# Patient Record
Sex: MALE | Race: BLACK OR AFRICAN AMERICAN | NOT HISPANIC OR LATINO | URBAN - METROPOLITAN AREA
[De-identification: names, ages, dates, MRNs, and addresses within clinical notes are randomized per-mention and may not be internally consistent; named-entity substitution may affect disease eponyms.]

---

## 2020-01-22 ENCOUNTER — EMERGENCY (EMERGENCY)
Facility: HOSPITAL | Age: 20
LOS: 0 days | Discharge: HOME | End: 2020-01-22
Attending: EMERGENCY MEDICINE
Payer: MEDICAID

## 2020-01-22 VITALS — WEIGHT: 164.91 LBS | HEIGHT: 71 IN

## 2020-01-22 VITALS
OXYGEN SATURATION: 97 % | SYSTOLIC BLOOD PRESSURE: 129 MMHG | TEMPERATURE: 99 F | DIASTOLIC BLOOD PRESSURE: 72 MMHG | HEART RATE: 69 BPM | RESPIRATION RATE: 18 BRPM

## 2020-01-22 DIAGNOSIS — R06.02 SHORTNESS OF BREATH: ICD-10-CM

## 2020-01-22 DIAGNOSIS — R07.9 CHEST PAIN, UNSPECIFIED: ICD-10-CM

## 2020-01-22 DIAGNOSIS — F12.10 CANNABIS ABUSE, UNCOMPLICATED: ICD-10-CM

## 2020-01-22 PROCEDURE — 71046 X-RAY EXAM CHEST 2 VIEWS: CPT

## 2020-01-22 PROCEDURE — 99283 EMERGENCY DEPT VISIT LOW MDM: CPT

## 2020-01-22 PROCEDURE — 99283 EMERGENCY DEPT VISIT LOW MDM: CPT | Mod: 25

## 2020-01-22 PROCEDURE — 94640 AIRWAY INHALATION TREATMENT: CPT

## 2020-01-22 PROCEDURE — 71046 X-RAY EXAM CHEST 2 VIEWS: CPT | Mod: 26

## 2020-01-22 PROCEDURE — 93005 ELECTROCARDIOGRAM TRACING: CPT

## 2020-01-22 PROCEDURE — 93010 ELECTROCARDIOGRAM REPORT: CPT

## 2020-01-22 RX ORDER — IBUPROFEN 200 MG
600 TABLET ORAL ONCE
Refills: 0 | Status: COMPLETED | OUTPATIENT
Start: 2020-01-22 | End: 2020-01-22

## 2020-01-22 RX ORDER — ALBUTEROL 90 UG/1
1 AEROSOL, METERED ORAL ONCE
Refills: 0 | Status: COMPLETED | OUTPATIENT
Start: 2020-01-22 | End: 2020-01-22

## 2020-01-22 RX ADMIN — ALBUTEROL 1 PUFF(S): 90 AEROSOL, METERED ORAL at 22:43

## 2020-01-22 NOTE — ED STATDOCS - OBJECTIVE STATEMENT
18 y/o male with no pertinent PMHx presents to the ED c/o chest pain and SOB x1 week. States "it feels like someone is putting a boulder on my chest." Chest pain and SOB are constant, even when at rest. Denies fever, nausea, vomiting, abdominal pain. No increase in physical activity. No recent illness. No recent travel. +smokes marijuana daily.

## 2020-01-22 NOTE — ED STATDOCS - PATIENT PORTAL LINK FT
You can access the FollowMyHealth Patient Portal offered by [facility] by registering at the following website: [email or web address]. By joining [employer]’s FollowMyHealth portal, you will also be able to view your health information using other applications (apps) compatible with our system.

## 2020-05-31 ENCOUNTER — EMERGENCY (EMERGENCY)
Facility: HOSPITAL | Age: 20
LOS: 0 days | Discharge: HOME | End: 2020-05-31
Attending: EMERGENCY MEDICINE
Payer: MEDICAID

## 2020-05-31 VITALS
RESPIRATION RATE: 18 BRPM | HEART RATE: 79 BPM | DIASTOLIC BLOOD PRESSURE: 76 MMHG | TEMPERATURE: 99 F | OXYGEN SATURATION: 100 % | SYSTOLIC BLOOD PRESSURE: 128 MMHG

## 2020-05-31 VITALS — WEIGHT: 164.91 LBS | HEIGHT: 71 IN

## 2020-05-31 DIAGNOSIS — M26.30 UNSPECIFIED ANOMALY OF TOOTH POSITION OF FULLY ERUPTED TOOTH OR TEETH: ICD-10-CM

## 2020-05-31 DIAGNOSIS — K13.79 OTHER LESIONS OF ORAL MUCOSA: ICD-10-CM
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
## 2020-05-31 PROCEDURE — 99282 EMERGENCY DEPT VISIT SF MDM: CPT

## 2020-05-31 PROCEDURE — 99283 EMERGENCY DEPT VISIT LOW MDM: CPT

## 2020-05-31 NOTE — ED ADULT TRIAGE NOTE - CHIEF COMPLAINT QUOTE
c/o front tooth displaced while playing basketball, other players elbow hit him on mouth, no bleeding noted, c/o headache

## 2020-05-31 NOTE — ED STATDOCS - PATIENT PORTAL LINK FT
You can access the FollowMyHealth Patient Portal offered by [facility] by registering at the following website: [email or web address]. By joining [employer]’s FollowMyHealth portal, you will also be able to view your health information using other applications (apps) compatible with our system.

## 2020-05-31 NOTE — ED STATDOCS - OBJECTIVE STATEMENT
20 yo M with no PMHx presents after he was elbowed in the mouth while playing basketball, he states one of his front teeth was pushed back and is now out of place but not wiggley, pt has no other acute complaints at this time.

## 2020-05-31 NOTE — ED STATDOCS - ENMT, MLM
R upper central incisor slightly posteriorly displaced Nasal mucosa clear.  Mouth with normal mucosa  Throat has no vesicles, no oropharyngeal exudates and uvula is midline.

## 2020-05-31 NOTE — ED STATDOCS - ENMT NEGATIVE STATEMENT, MLM
front tooth displacement, Ears: no ear pain and no hearing problems.Nose: no nasal congestion and no nasal drainage.Mouth/Throat: no dysphagia, no hoarseness and no throat pain.Neck: no lumps, no pain, no stiffness and no swollen glands.

## 2020-05-31 NOTE — ED STATDOCS - PROGRESS NOTE DETAILS
patient seen and examined with PA on arrival.  c/o displacement of R central incisor after being elbowed in the mouth while playing basketball.  denies further injury.  on exam R central incisor posteriorly displaced but remains firmly rooted.   discussed with pt he needs to follow up with [facility] tomorrow.  MD Sonia

## 2020-05-31 NOTE — ED STATDOCS - ATTENDING CONTRIBUTION TO CARE
I, Candice Gong MD,  performed the initial face to face bedside interview with this patient regarding history of present illness, review of symptoms and relevant past medical, social and family history.  I completed an independent physical examination.  I was the initial provider who evaluated this patient. I have signed out the follow up of any pending tests (i.e. labs, radiological studies) to the ACP.  I have communicated the patient’s plan of care and disposition with the ACP.

## 2020-05-31 NOTE — ED STATDOCS - NSFOLLOWUPINSTRUCTIONS_ED_ALL_ED_FT
Tooth Displacement  Tooth displacement (luxation) means that one of your teeth has been moved out of its normal position, but it has not fallen out or been knocked out of your mouth. This can happen to a baby tooth (primary tooth) or an adult tooth (permanent tooth). Usually, it happens to the teeth in the front of the mouth (incisors). There are three types of tooth displacement:  Extrusion. This is when the tooth is pulled up and out of its normal position. It may appear longer than it did in its normal position.Lateral displacement. This is when the tooth appears to be in front of or behind the normal row of teeth.Intrusion. This is when the tooth appears shorter than it did in its normal position, and it is pushed into the gum.What are the causes?  Common causes of this condition include trauma or injury to the mouth or jaw. A rare cause of this condition is disease, such as:  A tumor.Bone infection.Gum (periodontal) disease.What increases the risk?  The following factors may make you more likely to develop this condition:  Participating in high-risk activities or contact sports, such as football, downhill skiing, or boxing.Having gum disease and bone infection that affects the jaw.What are the signs or symptoms?  The main symptom of this condition is seeing the tooth moved out of its normal position. The tooth may also feel loose. Other symptoms include:  Pain, especially when chewing.Bleeding in or around the tooth.Facial swelling.Tooth discoloration.Swollen or bruised gums.Increased tooth sensitivity to heat and cold.How is this diagnosed?  This condition is diagnosed with a medical history and oral exam. You may also need dental X-rays to check for injuries to the root of the tooth.  How is this treated?  Treatment for this condition depends on the type of tooth displacement you have. It also depends on whether the displaced tooth is a primary tooth or a permanent tooth. Treatment may include:  Repositioning the tooth. This may be done with:  A surgical procedure.Orthodontic appliances, such as braces.A splint that keeps the tooth in place while it heals.Removing the tooth if it is too loose to splint.Removal of any chipped pieces of tooth.Medicine, including:  Pain medicine.Antibiotic medicine to help prevent infection.Follow these instructions at home:  Medicines     Take over-the-counter and prescription medicines only as told by your health care provider.If you were prescribed an antibiotic medicine, take it as told by your health care provider. Do not stop taking the antibiotic even if you start to feel better.Do not drive or use heavy machinery while taking prescription pain medicine.General instructions     Brush your teeth gently as directed by your health care provider.Check the injured area every day for signs of infection. Watch for:  Redness, swelling, or pain.Fluid, blood, or pus.Your health care provider may recommend that you eat certain foods. This may include eating only soft foods.Keep all follow-up visits as told by your health care provider. This is important.Contact a health care provider if:  You have pus coming from the site of the displaced tooth.You have swelling at the site of the displaced tooth.Your pain gets much worse, even after you take pain medicine.Your splint—if you have one—becomes loose.Your tooth becomes loose.Get help right away if:  You develop facial swelling.You have a fever.You have bleeding near the tooth that does not stop in 10 minutes.You have trouble swallowing.You have trouble opening your mouth.Your permanent tooth comes out after it is repositioned.Summary  Tooth displacement (luxation) means that one of your teeth has been moved out of its normal position, but it has not been knocked out of your mouth.Gum disease, bone infection, high-risk activities, and contact sports increase the risk for tooth displacement.Surgery, braces, or a splint may be used to reposition the tooth. You may also be given medicine for pain and antibiotic medicine to prevent infection.This information is not intended to replace advice given to you by your health care provider. Make sure you discuss any questions you have with your health care provider.

## 2021-02-09 PROBLEM — Z00.00 ENCOUNTER FOR PREVENTIVE HEALTH EXAMINATION: Status: ACTIVE | Noted: 2021-02-09

## 2021-02-19 ENCOUNTER — OUTPATIENT (OUTPATIENT)
Dept: OUTPATIENT SERVICES | Facility: HOSPITAL | Age: 21
LOS: 1 days | End: 2021-02-19
Payer: MEDICAID

## 2021-02-19 DIAGNOSIS — Z20.828 CONTACT WITH AND (SUSPECTED) EXPOSURE TO OTHER VIRAL COMMUNICABLE DISEASES: ICD-10-CM

## 2021-02-19 LAB — SARS-COV-2 RNA SPEC QL NAA+PROBE: SIGNIFICANT CHANGE UP

## 2021-02-19 PROCEDURE — C9803: CPT

## 2021-02-19 PROCEDURE — U0005: CPT

## 2021-02-19 PROCEDURE — U0003: CPT

## 2021-02-20 DIAGNOSIS — Z20.828 CONTACT WITH AND (SUSPECTED) EXPOSURE TO OTHER VIRAL COMMUNICABLE DISEASES: ICD-10-CM

## 2021-06-23 ENCOUNTER — APPOINTMENT (OUTPATIENT)
Dept: GASTROENTEROLOGY | Facility: CLINIC | Age: 21
End: 2021-06-23